# Patient Record
Sex: MALE | Race: WHITE | Employment: FULL TIME | URBAN - METROPOLITAN AREA
[De-identification: names, ages, dates, MRNs, and addresses within clinical notes are randomized per-mention and may not be internally consistent; named-entity substitution may affect disease eponyms.]

---

## 2017-01-31 ENCOUNTER — APPOINTMENT (OUTPATIENT)
Dept: GENERAL RADIOLOGY | Facility: CLINIC | Age: 27
End: 2017-01-31
Attending: EMERGENCY MEDICINE
Payer: COMMERCIAL

## 2017-01-31 ENCOUNTER — HOSPITAL ENCOUNTER (EMERGENCY)
Facility: CLINIC | Age: 27
Discharge: HOME | End: 2017-01-31
Attending: EMERGENCY MEDICINE | Admitting: EMERGENCY MEDICINE
Payer: COMMERCIAL

## 2017-01-31 VITALS
OXYGEN SATURATION: 98 % | RESPIRATION RATE: 16 BRPM | SYSTOLIC BLOOD PRESSURE: 118 MMHG | DIASTOLIC BLOOD PRESSURE: 72 MMHG | TEMPERATURE: 98.3 F

## 2017-01-31 DIAGNOSIS — T78.40XA ALLERGIC REACTION, INITIAL ENCOUNTER: ICD-10-CM

## 2017-01-31 DIAGNOSIS — R22.0 RIGHT FACIAL SWELLING: ICD-10-CM

## 2017-01-31 DIAGNOSIS — R22.0 NASAL SWELLING: ICD-10-CM

## 2017-01-31 PROCEDURE — 99284 EMERGENCY DEPT VISIT MOD MDM: CPT | Performed by: EMERGENCY MEDICINE

## 2017-01-31 PROCEDURE — 96365 THER/PROPH/DIAG IV INF INIT: CPT

## 2017-01-31 PROCEDURE — 94640 AIRWAY INHALATION TREATMENT: CPT

## 2017-01-31 PROCEDURE — 96375 TX/PRO/DX INJ NEW DRUG ADDON: CPT

## 2017-01-31 PROCEDURE — 70220 X-RAY EXAM OF SINUSES: CPT

## 2017-01-31 PROCEDURE — 25000128 H RX IP 250 OP 636: Performed by: EMERGENCY MEDICINE

## 2017-01-31 PROCEDURE — 96361 HYDRATE IV INFUSION ADD-ON: CPT

## 2017-01-31 PROCEDURE — 94640 AIRWAY INHALATION TREATMENT: CPT | Mod: 76

## 2017-01-31 PROCEDURE — 40000275 ZZH STATISTIC RCP TIME EA 10 MIN

## 2017-01-31 PROCEDURE — 99284 EMERGENCY DEPT VISIT MOD MDM: CPT | Mod: 25

## 2017-01-31 PROCEDURE — 25000125 ZZHC RX 250: Performed by: EMERGENCY MEDICINE

## 2017-01-31 RX ORDER — METHYLPREDNISOLONE SODIUM SUCCINATE 125 MG/2ML
125 INJECTION, POWDER, LYOPHILIZED, FOR SOLUTION INTRAMUSCULAR; INTRAVENOUS ONCE
Status: COMPLETED | OUTPATIENT
Start: 2017-01-31 | End: 2017-01-31

## 2017-01-31 RX ORDER — PREDNISONE 10 MG/1
TABLET ORAL
Qty: 32 TABLET | Refills: 0 | Status: SHIPPED | OUTPATIENT
Start: 2017-01-31 | End: 2017-02-10

## 2017-01-31 RX ORDER — EPINEPHRINE 0.3 MG/.3ML
0.3 INJECTION SUBCUTANEOUS
Qty: 0.6 ML | Refills: 0 | Status: SHIPPED | OUTPATIENT
Start: 2017-01-31

## 2017-01-31 RX ORDER — DIPHENHYDRAMINE HYDROCHLORIDE 50 MG/ML
50 INJECTION INTRAMUSCULAR; INTRAVENOUS ONCE
Status: COMPLETED | OUTPATIENT
Start: 2017-01-31 | End: 2017-01-31

## 2017-01-31 RX ORDER — IPRATROPIUM BROMIDE AND ALBUTEROL SULFATE 2.5; .5 MG/3ML; MG/3ML
3 SOLUTION RESPIRATORY (INHALATION) ONCE
Status: COMPLETED | OUTPATIENT
Start: 2017-01-31 | End: 2017-01-31

## 2017-01-31 RX ORDER — ALBUTEROL SULFATE 90 UG/1
2 AEROSOL, METERED RESPIRATORY (INHALATION) EVERY 4 HOURS PRN
Qty: 1 INHALER | Refills: 0 | Status: SHIPPED | OUTPATIENT
Start: 2017-01-31

## 2017-01-31 RX ORDER — SODIUM CHLORIDE 9 MG/ML
1000 INJECTION, SOLUTION INTRAVENOUS CONTINUOUS
Status: DISCONTINUED | OUTPATIENT
Start: 2017-01-31 | End: 2017-01-31 | Stop reason: HOSPADM

## 2017-01-31 RX ADMIN — DIPHENHYDRAMINE HYDROCHLORIDE 50 MG: 50 INJECTION, SOLUTION INTRAMUSCULAR; INTRAVENOUS at 12:58

## 2017-01-31 RX ADMIN — FAMOTIDINE 20 MG: 20 INJECTION, SOLUTION INTRAVENOUS at 13:31

## 2017-01-31 RX ADMIN — IPRATROPIUM BROMIDE AND ALBUTEROL SULFATE 3 ML: .5; 3 SOLUTION RESPIRATORY (INHALATION) at 12:50

## 2017-01-31 RX ADMIN — SODIUM CHLORIDE 1000 ML: 9 INJECTION, SOLUTION INTRAVENOUS at 12:58

## 2017-01-31 RX ADMIN — METHYLPREDNISOLONE SODIUM SUCCINATE 125 MG: 125 INJECTION, POWDER, FOR SOLUTION INTRAMUSCULAR; INTRAVENOUS at 12:59

## 2017-01-31 RX ADMIN — IPRATROPIUM BROMIDE AND ALBUTEROL SULFATE 3 ML: .5; 3 SOLUTION RESPIRATORY (INHALATION) at 13:31

## 2017-01-31 NOTE — ED AVS SNAPSHOT
[facility] Emergency Department    [address] [facility] [address]    Phone:  [phone]    Fax:  [phone]                                       Jose Daniel Olvera   MRN: [id number]    Department:  [facility] Emergency Department   Date of Visit:  1/31/2017           After Visit Summary Signature Page     I have received my discharge instructions, and my questions have been answered. I have discussed any challenges I see with this plan with the nurse or doctor.    ..........................................................................................................................................  Patient/Patient Representative Signature      ..........................................................................................................................................  Patient Representative Print Name and Relationship to Patient    ..................................................               ................................................  Date                                            Time    ..........................................................................................................................................  Reviewed by Signature/Title    ...................................................              ..............................................  Date                                                            Time

## 2017-01-31 NOTE — ED NOTES
Pt started feeling like he was having an allergic reaction last evening.  Took Benadryl before bed.  throat felt tight last night and again today.   Pt has red sore throat.  No Benadryl today.

## 2017-01-31 NOTE — ED PROVIDER NOTES
History     Chief Complaint   Patient presents with     Allergic Reaction     Pt started feeling like he was having an allergic reaction last evening.  Took Benadryl before bed.  throat felt tight last night and again today.      HPI  Jose Daniel Olvera is a 26 year old male who with possible allergic reaction.  Patient states that last evening he noted slight swelling on the right lateral lower eyelid area.  Subsequently this area swelled significantly and he developed tightness in his throat.  He also has had some wheezing but this is not unusual for him.  He did not use his inhaler last evening or today.  He took Benadryl with some improvement but upon awaking this morning his symptoms have recurred.  He denies chest pain or significant shortness of breath currently.  He has not had a cough.  He is able to handle secretions and drink without difficulty.  His voice is not changed.  Other than the swelling on his face he denies other skin rashes.  He does have mild pain behind his right ear.  There's been no drainage from the ear.  He's had previous reactions when he is eating apples but has not had any recently.  No other new contacts or exposures, soaps, lotions, medicines, or recent upper respiratory illness.    I have reviewed the Medications, Allergies, Past Medical and Surgical History, and Social History in the Epic system.    Review of Systems all other systems reviewed and are negative.  No past medical history on file.  There is no problem list on file for this patient.    Current Facility-Administered Medications   Medication     0.9% sodium chloride infusion     famotidine (PEPCID) infusion 20 mg     Current Outpatient Prescriptions   Medication     albuterol (ALBUTEROL) 108 (90 BASE) MCG/ACT Inhaler     predniSONE (DELTASONE) 10 MG tablet     EPINEPHrine 0.3 MG/0.3ML injection     [DISCONTINUED] albuterol (PROAIR HFA, PROVENTIL HFA, VENTOLIN HFA) 108 (90 BASE) MCG/ACT inhaler        Allergies   Allergen  Reactions     Augmentin Hives and Itching     Social History     Social History     Marital Status: Single     Spouse Name: N/A     Number of Children: N/A     Years of Education: N/A     Occupational History     Not on file.     Social History Main Topics     Smoking status: Not on file     Smokeless tobacco: Not on file     Alcohol Use: Not on file     Drug Use: Not on file     Sexual Activity: Not on file     Other Topics Concern     Not on file     Social History Narrative     No narrative on file     No family history on file.    Physical Exam   BP: (!) 143/93 mmHg  Heart Rate: 65  Temp: 98.3  F (36.8  C)  Resp: 16  SpO2: 98 %  Physical Exam Gen. alert cooperative male in mild to moderate distress.  HEENT shows pupils to be equal and react to light and accommodation.  Extraocular muscles are intact.  No scleral injection or mattering.  He has soft tissue swelling under and lateral to the right eye and cheek area.  It is not erythematous or warm to the touch.  His ears are clear bilaterally.  Nasal passages are swollen to near occlusion without drainage currently.  Orally he has mild tonsillar erythema and enlargement but no exudate.  He has mild tenderness of the upper right gum line but there is no erythema, fluctuance, or dental tenderness.  Behind his right ear he has a small lesion consistent with a comedone.  Scalp examination reveals no other lesions that would suggest shingles.  Neck is supple without adenopathy or stridor.  Lungs reveal expiratory wheezes.  Cardiac regular rate without murmur.  Other than the face and ear issues no other skin rashes are noted.    ED Course   Procedures         Results for orders placed or performed during the hospital encounter of 01/31/17   XR Sinus Complete G/E 3 Views    Narrative    XR SINUS COMPLETE G/E 3 VW 1/31/2017 2:05 PM    COMPARISON: None.    HISTORY: Right facial swelling with nasal congestion and drainage.      Impression    IMPRESSION: No air-fluid levels  "are seen in the sinuses.    DAMIÁN GREY            Critical Care time:  none               Labs Ordered and Resulted from Time of ED Arrival Up to the Time of Departure from the ED - No data to display  IV is established patient was given Benadryl, Solu-Medrol, and Pepcid.  At 120 and rechecked the patient still had swelling under the eye.  Did not feel tight in his throat.  Still wheezing.  DuoNeb is ordered.  Sinus water x-rays ordered.  At 245 on recheck patient states he had good response from the DuoNeb.  He does not have an inhaler at home so that will be provided.  He and his girlfriend also requested an EpiPen as he \"travels a lot.\"  He has never had a anaphylactic reaction.  Patient's facial swelling has improved.  Assessments & Plan (with Medical Decision Making)   Gen. this is a 26-year-old male with possible allergic reaction with facial swelling and throat tightness.  He's had similar reactions previously when eating apple products but has not eaten apples recently.  He denies any other new exposures to medicines, clothing, lotions, soaps, or foods.  He does have a history of reactive airways and asthma.  He's never had issues with aspirin or NSAIDs causing hives.  Patient took Benadryl last evening with some improvement.  On exam patient is afebrile and vital signs are stable.  He has swelling on his right cheek extending up under his right eye.  The eye itself is not involved.  He has bilateral nasal swelling.  He does not have tenderness over the frontal or maxillary sinuses.  A sinus x-ray shows no evidence of sinus disease.  He did have a small comedone on the back of his right ear but no other blisters or lesions are noted that would suggest shingles.  They will watch for disorder.  Patient did have wheezing on auscultation which improved with IV meds and nebulization.  Patient denies tobacco use.  Patient will be given a steroid burst and taper.  He understands this can cause GI upset and " insomnia.  Also provided a albuterol inhaler for recurrent wheezing.  He still urged help with his reactive airways also.  He can continue Benadryl.  He is given a prescription for EpiPen that he can fill but he understands if he is that he needs to be seen immediately.  Handout on allergic reaction is provided.  Reasons to return for reassessment were discussed.  I have reviewed the nursing notes.    I have reviewed the findings, diagnosis, plan and need for follow up with the patient.    New Prescriptions    ALBUTEROL (ALBUTEROL) 108 (90 BASE) MCG/ACT INHALER    Inhale 2 puffs into the lungs every 4 hours as needed for shortness of breath / dyspnea    EPINEPHRINE 0.3 MG/0.3ML INJECTION    Inject 0.3 mLs (0.3 mg) into the muscle once as needed    PREDNISONE (DELTASONE) 10 MG TABLET    Take 4 tablets daily for 5 days,  take 2 tablets daily for 3 days, take 1 tablet daily for 3 days, take half a tablet for 3 days.       Final diagnoses:   Right facial swelling   Allergic reaction, initial encounter   Nasal swelling       1/31/2017   [facility] EMERGENCY DEPARTMENT      Ga Macias MD  01/31/17 1006

## 2017-01-31 NOTE — DISCHARGE INSTRUCTIONS
"  Allergic Reaction, Other [Local]  You are having an allergic reaction. This is due to exposure to something you have become sensitive to. This may be a household product, medicine, chemical, soap, cream, cosmetics or jewelry. A sting from an insect (that you were not aware of) can also cause this reaction. Sometimes it is difficult to know exactly what caused this reaction. There may be redness, itching, and swelling. The rash will fade over the next few days.  Home Care:    If itching is a problem, avoid anything that heats up your skin (hot showers/baths, direct sunlight) since heat will make itching worse.    An ice pack (ice cubes in a plastic bag, wrapped in a towel) will reduce local areas of redness and itching. Lanacaine cream or Solarcaine spray (or other product containing \"benzocaine\") will reduce the itching.    Oral Benadryl (diphenhydramine) is an antihistamine available at drug and grocery stores. Unless a prescription antihistamine was given, Benadryl may be used to reduce itching if large areas of the skin are involved. Use lower doses during the daytime and higher doses at bedtime since the drug may make you sleepy. [NOTE: Do not use Benadryl if you have glaucoma or if you are a man with trouble urinating due to an enlarged prostate.] Claritin (loratadine) is an antihistamine that causes less drowsiness and is a good alternative for daytime use.  Follow Up  with your doctor or this facility in the next two days if your symptoms do not continue to improve.  Get Prompt Medical Attention  if any of the following occur:    Spreading areas of itching, redness or swelling    New or worse swelling in the face, eyelids, lips, mouth, throat or tongue    Trouble swallowing or breathing    Dizziness, weakness or fainting    Signs of infection:    Spreading redness    Increased pain or swelling    Fever of 100.4 F (38 C) or higher, or as directed by your healthcare provider    Colored fluid draining from the " wound    5079-9555 [employer] [address] [place] [address], [place] [address]. All rights reserved. This information is not intended as a substitute for professional medical care. Always follow your healthcare professional's instructions.

## 2017-01-31 NOTE — ED AVS SNAPSHOT
" [facility] Emergency Department    [address] [facility] [address]    Phone:  [phone]    Fax:  [phone]                                       Jose Daniel Olvera   MRN: [id number]    Department:  [facility] Emergency Department   Date of Visit:  1/31/2017           Patient Information     Date Of Birth          1990        Your diagnoses for this visit were:     Right facial swelling     Allergic reaction, initial encounter     Nasal swelling        You were seen by Ga Macias MD.      Follow-up Information     Follow up with Primary Deloris Laura MD.    Why:  As needed        Discharge Instructions         Allergic Reaction, Other [Local]  You are having an allergic reaction. This is due to exposure to something you have become sensitive to. This may be a household product, medicine, chemical, soap, cream, cosmetics or jewelry. A sting from an insect (that you were not aware of) can also cause this reaction. Sometimes it is difficult to know exactly what caused this reaction. There may be redness, itching, and swelling. The rash will fade over the next few days.  Home Care:    If itching is a problem, avoid anything that heats up your skin (hot showers/baths, direct sunlight) since heat will make itching worse.    An ice pack (ice cubes in a plastic bag, wrapped in a towel) will reduce local areas of redness and itching. Lanacaine cream or Solarcaine spray (or other product containing \"benzocaine\") will reduce the itching.    Oral Benadryl (diphenhydramine) is an antihistamine available at drug and grocery stores. Unless a prescription antihistamine was given, Benadryl may be used to reduce itching if large areas of the skin are involved. Use lower doses during the daytime and higher doses at bedtime since the drug may make you sleepy. [NOTE: Do not use Benadryl if you have glaucoma or if you are a man with trouble urinating due to an enlarged prostate.] Claritin (loratadine) is an " antihistamine that causes less drowsiness and is a good alternative for daytime use.  Follow Up  with your doctor or this facility in the next two days if your symptoms do not continue to improve.  Get Prompt Medical Attention  if any of the following occur:    Spreading areas of itching, redness or swelling    New or worse swelling in the face, eyelids, lips, mouth, throat or tongue    Trouble swallowing or breathing    Dizziness, weakness or fainting    Signs of infection:    Spreading redness    Increased pain or swelling    Fever of 100.4 F (38 C) or higher, or as directed by your healthcare provider    Colored fluid draining from the wound    4295-2460 [employer]. [address] [place] [address], [place] [address]. All rights reserved. This information is not intended as a substitute for professional medical care. Always follow your healthcare professional's instructions.          24 Hour Appointment Hotline       To make an appointment at any [facility], call [phone] ([phone]). If you don't have a family doctor or clinic, we will help you find one. [place] clinics are conveniently located to serve the needs of you and your family.             Review of your medicines      START taking        Dose / Directions Last dose taken    EPINEPHrine 0.3 MG/0.3ML injection   Dose:  0.3 mg   Quantity:  0.6 mL        Inject 0.3 mLs (0.3 mg) into the muscle once as needed   Refills:  0        predniSONE 10 MG tablet   Commonly known as:  DELTASONE   Quantity:  32 tablet        Take 4 tablets daily for 5 days,  take 2 tablets daily for 3 days, take 1 tablet daily for 3 days, take half a tablet for 3 days.   Refills:  0          CONTINUE these medicines which may have CHANGED, or have new prescriptions. If we are uncertain of the size of tablets/capsules you have at home, strength may be listed as something that might have changed.        Dose / Directions Last dose taken    albuterol 108 (90 BASE)  "MCG/ACT Inhaler   Commonly known as:  albuterol   Dose:  2 puff   What changed:    - when to take this  - reasons to take this   Quantity:  1 Inhaler        Inhale 2 puffs into the lungs every 4 hours as needed for shortness of breath / dyspnea   Refills:  0                Prescriptions were sent or printed at these locations (3 Prescriptions)                   Other Prescriptions                Printed at Department/Unit printer (3 of 3)         albuterol (ALBUTEROL) 108 (90 BASE) MCG/ACT Inhaler               predniSONE (DELTASONE) 10 MG tablet               EPINEPHrine 0.3 MG/0.3ML injection                Procedures and tests performed during your visit     XR Sinus Complete G/E 3 Views      Orders Needing Specimen Collection     None      Pending Results     No orders found from 1/30/2017 to 2/1/2017.            Pending Culture Results     No orders found from 1/30/2017 to 2/1/2017.       Test Results from your hospital stay           1/31/2017  2:13 PM - Interface, Radiant Ib      Narrative     XR SINUS COMPLETE G/E 3 VW 1/31/2017 2:05 PM    COMPARISON: None.    HISTORY: Right facial swelling with nasal congestion and drainage.        Impression     IMPRESSION: No air-fluid levels are seen in the sinuses.    DAMIÁN GREY                Thank you for choosing [facility]       Thank you for choosing [facility] for your care. Our goal is always to provide you with excellent care. Hearing back from our patients is one way we can continue to improve our services. Please take a few minutes to complete the written survey that you may receive in the mail after you visit with us. Thank you!        [email or web address] Information     [employer] lets you send messages to your doctor, view your test results, renew your prescriptions, schedule appointments and more. To sign up, go to [email or web address] . Click on \"Log in\" on the left side of the screen, which will take you to the Welcome page. Then click on \"Sign up Now\" on the right " side of the page.     You will be asked to enter the access code listed below, as well as some personal information. Please follow the directions to create your username and password.     Your access code is: [email or web address]  Expires: 2017  2:53 PM     Your access code will  in 90 days. If you need help or a new code, please call your [facility] clinic or [phone].        Care EveryWhere ID     This is your Care EveryWhere ID. This could be used by other organizations to access your [facility] medical records  [id number]        After Visit Summary       This is your record. Keep this with you and show to your community pharmacist(s) and doctor(s) at your next visit.

## 2017-01-31 NOTE — ED NOTES
"Pt reports right eye \" a little puffy \" last night at 2130 and felt a little tightness in throat but no breathing.   Pt reports nasal congestion and \"felt nose was completely blocked last night\"    Pt took 3 benadryl and went to bed.   Pt woke up this am with increased puffiness around right eye.  A little bit of tightness in throat, swallowing better than last night, denies difficulty breathing.   No edema around throat noted.    Girlfriend at bedside.    "

## 2018-06-21 ENCOUNTER — HOSPITAL ENCOUNTER (EMERGENCY)
Facility: CLINIC | Age: 28
Discharge: HOME | End: 2018-06-21
Attending: STUDENT IN AN ORGANIZED HEALTH CARE EDUCATION/TRAINING PROGRAM | Admitting: STUDENT IN AN ORGANIZED HEALTH CARE EDUCATION/TRAINING PROGRAM
Payer: COMMERCIAL

## 2018-06-21 VITALS
RESPIRATION RATE: 20 BRPM | DIASTOLIC BLOOD PRESSURE: 84 MMHG | SYSTOLIC BLOOD PRESSURE: 146 MMHG | HEART RATE: 77 BPM | TEMPERATURE: 98.6 F | OXYGEN SATURATION: 97 %

## 2018-06-21 DIAGNOSIS — T78.40XA ALLERGIC REACTION, INITIAL ENCOUNTER: ICD-10-CM

## 2018-06-21 DIAGNOSIS — L50.9 URTICARIA: ICD-10-CM

## 2018-06-21 PROCEDURE — 96375 TX/PRO/DX INJ NEW DRUG ADDON: CPT | Performed by: STUDENT IN AN ORGANIZED HEALTH CARE EDUCATION/TRAINING PROGRAM

## 2018-06-21 PROCEDURE — 96366 THER/PROPH/DIAG IV INF ADDON: CPT | Performed by: STUDENT IN AN ORGANIZED HEALTH CARE EDUCATION/TRAINING PROGRAM

## 2018-06-21 PROCEDURE — 96374 THER/PROPH/DIAG INJ IV PUSH: CPT | Performed by: STUDENT IN AN ORGANIZED HEALTH CARE EDUCATION/TRAINING PROGRAM

## 2018-06-21 PROCEDURE — 96372 THER/PROPH/DIAG INJ SC/IM: CPT | Performed by: STUDENT IN AN ORGANIZED HEALTH CARE EDUCATION/TRAINING PROGRAM

## 2018-06-21 PROCEDURE — 25000128 H RX IP 250 OP 636: Performed by: STUDENT IN AN ORGANIZED HEALTH CARE EDUCATION/TRAINING PROGRAM

## 2018-06-21 PROCEDURE — 96365 THER/PROPH/DIAG IV INF INIT: CPT | Performed by: STUDENT IN AN ORGANIZED HEALTH CARE EDUCATION/TRAINING PROGRAM

## 2018-06-21 PROCEDURE — 99284 EMERGENCY DEPT VISIT MOD MDM: CPT | Mod: 25 | Performed by: STUDENT IN AN ORGANIZED HEALTH CARE EDUCATION/TRAINING PROGRAM

## 2018-06-21 PROCEDURE — 99284 EMERGENCY DEPT VISIT MOD MDM: CPT | Mod: Z6 | Performed by: STUDENT IN AN ORGANIZED HEALTH CARE EDUCATION/TRAINING PROGRAM

## 2018-06-21 PROCEDURE — 25000125 ZZHC RX 250: Performed by: STUDENT IN AN ORGANIZED HEALTH CARE EDUCATION/TRAINING PROGRAM

## 2018-06-21 RX ORDER — LIDOCAINE 40 MG/G
CREAM TOPICAL
Status: DISCONTINUED | OUTPATIENT
Start: 2018-06-21 | End: 2018-06-22 | Stop reason: HOSPADM

## 2018-06-21 RX ORDER — METHYLPREDNISOLONE SODIUM SUCCINATE 125 MG/2ML
125 INJECTION, POWDER, LYOPHILIZED, FOR SOLUTION INTRAMUSCULAR; INTRAVENOUS ONCE
Status: COMPLETED | OUTPATIENT
Start: 2018-06-21 | End: 2018-06-21

## 2018-06-21 RX ADMIN — EPINEPHRINE 0.3 MG: 1 INJECTION, SOLUTION, CONCENTRATE INTRAVENOUS at 22:50

## 2018-06-21 RX ADMIN — FAMOTIDINE 20 MG: 20 INJECTION, SOLUTION INTRAVENOUS at 21:28

## 2018-06-21 RX ADMIN — SODIUM CHLORIDE 1000 ML: 9 INJECTION, SOLUTION INTRAVENOUS at 21:22

## 2018-06-21 RX ADMIN — METHYLPREDNISOLONE SODIUM SUCCINATE 125 MG: 125 INJECTION, POWDER, FOR SOLUTION INTRAMUSCULAR; INTRAVENOUS at 21:22

## 2018-06-21 NOTE — ED AVS SNAPSHOT
[facility] Emergency Department    [address] [facility] [address]    Phone:  [phone]    Fax:  [phone]                                       Jose Daniel Olvera   MRN: [id number]    Department:  [facility] Emergency Department   Date of Visit:  6/21/2018           After Visit Summary Signature Page     I have received my discharge instructions, and my questions have been answered. I have discussed any challenges I see with this plan with the nurse or doctor.    ..........................................................................................................................................  Patient/Patient Representative Signature      ..........................................................................................................................................  Patient Representative Print Name and Relationship to Patient    ..................................................               ................................................  Date                                            Time    ..........................................................................................................................................  Reviewed by Signature/Title    ...................................................              ..............................................  Date                                                            Time

## 2018-06-21 NOTE — ED AVS SNAPSHOT
[facility] Emergency Department    [address] [facility] [address]    Phone:  [phone]    Fax:  [phone]                                       Jose Daniel Olvera   MRN: [id number]    Department:  [facility] Emergency Department   Date of Visit:  6/21/2018           Patient Information     Date Of Birth          1990        Your diagnoses for this visit were:     Allergic reaction, initial encounter     Urticaria        You were seen by Mason Gleason DO.      Follow-up Information     Follow up with [facility]. Schedule an appointment as soon as possible for a visit in 1 week.    Specialty:  Allergy    Why:  Followup for reevaluation and managment plan.    Contact information:    [address] [place] [address] [phone]    Additional information:    The medical center is located at   [address] [place] [address] (between [address]   [address] [facility], four miles north   of [place]).        Go to [facility] Emergency Department.    Specialty:  EMERGENCY MEDICINE    Why:  Return if symptoms change/progress.    Contact information:    [facility] [address] [phone]    Additional information:    The medical center is located at   [address] [place] [address] (between [facility] and   [address] in [place], four miles north   of [place]).      Discharge References/Attachments     REACTION, MEDICINE: ALLERGIC (ENGLISH)    ANAPHYLAXIS, GENERAL (ENGLISH)      24 Hour Appointment Hotline       To make an appointment at any [facility], call [phone] ([phone]). If you don't have a family doctor or clinic, we will help you find one. [facility] are conveniently located to serve the needs of you and your family.          ED Discharge Orders     ALLERGY/ASTHMA ADULT REFERRAL       Your provider has referred you to: [facility]: [facility] [phone]  [email or web address]    Please be aware that coverage of these services is subject to the terms and limitations of your health insurance plan.  Call member services at your health plan with any benefit or coverage questions.      Please bring the following with you to your appointment:    (1) Any X-Rays, CTs or MRIs which have been performed.  Contact the facility where they were done to arrange for  prior to your scheduled appointment.    (2) List of current medications  (3) This referral request   (4) Any documents/labs given to you for this referral                     Review of your medicines      Our records show that you are taking the medicines listed below. If these are incorrect, please call your family doctor or clinic.        Dose / Directions Last dose taken    albuterol 108 (90 Base) MCG/ACT Inhaler   Commonly known as:  PROAIR HFA   Dose:  2 puff   Quantity:  1 Inhaler        Inhale 2 puffs into the lungs every 4 hours as needed for shortness of breath / dyspnea   Refills:  0        EPINEPHrine 0.3 MG/0.3ML injection 2-pack   Commonly known as:  EPIPEN/ADRENACLICK/or ANY BX GENERIC EQUIV   Dose:  0.3 mg   Quantity:  0.6 mL        Inject 0.3 mLs (0.3 mg) into the muscle once as needed   Refills:  0                Procedures and tests performed during your visit     Cardiac Continuous Monitoring    Peripheral IV: Standard    Pulse oximetry nursing    Vital signs      Orders Needing Specimen Collection     None      Pending Results     No orders found from 6/19/2018 to 6/22/2018.            Pending Culture Results     No orders found from 6/19/2018 to 6/22/2018.            Pending Results Instructions     If you had any lab results that were not finalized at the time of your Discharge, you can call the ED Lab Result RN at [phone]. You will be contacted by this team for any positive Lab results or changes in treatment. The nurses are available 7 days a week from 10A to  "6:30P.  You can leave a message 24 hours per day and they will return your call.        Test Results From Your Hospital Stay               Thank you for choosing [facility]       Thank you for choosing [facility] for your care. Our goal is always to provide you with excellent care. Hearing back from our patients is one way we can continue to improve our services. Please take a few minutes to complete the written survey that you may receive in the mail after you visit with us. Thank you!        [facility] Information     [facility] lets you send messages to your doctor, view your test results, renew your prescriptions, schedule appointments and more. To sign up, go to [email or web address] . Click on \"Log in\" on the left side of the screen, which will take you to the Welcome page. Then click on \"Sign up Now\" on the right side of the page.     You will be asked to enter the access code listed below, as well as some personal information. Please follow the directions to create your username and password.     Your access code is: [email or web address]  Expires: 2018 11:42 PM     Your access code will  in 90 days. If you need help or a new code, please call your [facility] clinic or [phone].        Care EveryWhere ID     This is your Care EveryWhere ID. This could be used by other organizations to access your [facility] medical records  [id number]        Equal Access to Services     REAL LOZA AH: Hadii kacie Higgins, waaxda luqadaha, qaybta kaalmada jenae, misael taylor. So [facility] [phone].    ATENCIÓN: Si habla español, tiene a kitchen disposición servicios gratuitos de asistencia lingüística. Alicia al [phone].    We comply with applicable federal civil rights laws and [place] laws. We do not discriminate on the basis of race, color, national origin, age, disability, sex, sexual orientation, or gender identity.            After Visit Summary       This is your record. Keep this " with you and show to your community pharmacist(s) and doctor(s) at your next visit.

## 2018-06-22 NOTE — ED PROVIDER NOTES
History     Chief Complaint   Patient presents with     Facial Swelling     right eye, allergic reaction     HPI  Jose Daniel Olvera is a 28 year old male who presents for evaluation of facial and lip swelling as well as skin rash which developed around 3 hours prior to arrival.  Patient explains that he had no history of seasonal or environmental allergies growing up but over the past couple of years has had episodes of facial swelling and skin rashes abruptly developed and he attributes to certain fruits and vegetables.  He states that [place] when he went outside and felt an itch along the outside of his right eye, after scratching the itchy developed right-sided periorbital swelling.  Shortly afterwards he developed upper lip angioedema and skin rash of torso and upper extremities.  The symptoms are similar to what he has experienced in the past, he did not have an epinephrine pen with him but did take 5 tablets of 25 mg Benadryl orally before coming to the department.  He feels as though the symptoms may be improving a bit but still has upper lip edema.  Patient denies headache, sore throat, chest pain, shortness of breath, difficulty swallowing, abdominal pain, nausea/vomiting, diarrhea, or other associated symptoms.    Problem List:    There are no active problems to display for this patient.       Past Medical History:    No past medical history on file.    Past Surgical History:    No past surgical history on file.    Family History:    No family history on file.    Social History:  Marital Status:  Single [1]  Social History   Substance Use Topics     Smoking status: Not on file     Smokeless tobacco: Not on file     Alcohol use Not on file        Medications:      albuterol (ALBUTEROL) 108 (90 BASE) MCG/ACT Inhaler   EPINEPHrine 0.3 MG/0.3ML injection         Review of Systems  Constitutional:  Negative for fever or recent illness.  HENT: Positive for upper lip angioedema.   Negative for tongue swelling or throat swelling sensation.  Eye: Negative for diplopia or diminished vision from baseline.  Cardiovascular:  Negative for chest pain.  Respiratory:  Negative for cough or shortness of breath.  Gastrointestinal:  Negative for abdominal pain, nausea, vomiting, or diarrhea.   Musculoskeletal:  Negative for neck stiffness or joint pain.  Neurological:  Negative for headache.  Skin: Positive for pruritic rash of torso.    All others reviewed and are negative.      Physical Exam   BP: (!) 146/97  Pulse: 77  Temp: 98.6  F (37  C)  Resp: 20  SpO2: 97 %      Physical Exam  Constitutional:  Well developed, well nourished.  Appears nontoxic and in no acute distress.   HENT: Subtle angioedema of upper lip bilaterally.  Pronounced right sided periorbital edema.  No sign of injury or trauma.  Eyes:  Conjunctivae are normal.  EOMI.  PERRLA.  Oral:  Patient is without trismus.  Moist oral mucosa.  Normal dentition for age.  Negative pharyngeal erythema.  No exudate or soft palate petechiae.  No uvular asymmetry.  Without sublingual or submental swelling.  Voice is not muffled.  Tolerates secretions.  Neck:  Neck supple.  Cardiovascular:  No cyanosis.  RRR.  No audible murmurs noted.   Respiratory:  Effort normal, no respiratory distress.  CTAB without diminished regions.  No wheezing, rhonchi, or crackles.  Gastrointestinal:  Soft, nondistended abdomen.  Nontender and without guarding.  No rigidity or rebound tenderness.  Negative Jane's sign.    Genitourinary:  Noncontributory.  Musculoskeletal:  Moves extremities spontaneously.  Neurological:  Patient is alert.  Skin:  Skin is warm and dry.  Urticarial rash of volar aspect of upper extremities and anterior torso.  Psychiatric:  Normal mood and affect.      ED Course     ED Course     Procedures              Critical Care time:  none               No results found for this or any previous visit (from the past 24 hour(s)).    Medications    lidocaine 1 % 1 mL (not administered)   lidocaine (LMX4) kit (not administered)   sodium chloride (PF) 0.9% PF flush 3 mL (not administered)   sodium chloride (PF) 0.9% PF flush 3 mL (not administered)   0.9% sodium chloride BOLUS (0 mLs Intravenous Stopped 6/21/18 2338)   EPINEPHrine (ADRENALIN) kit 0.3 mg (0.3 mg Intramuscular Given 6/21/18 2250)   methylPREDNISolone sodium succinate (solu-MEDROL) injection 125 mg (125 mg Intravenous Given 6/21/18 2122)   famotidine (PEPCID) infusion 20 mg (0 mg Intravenous Stopped 6/21/18 2338)       Assessments & Plan (with Medical Decision Making)   Jose Daniel Olvera is a 28 year old male who presents to the department for evaluation of right-sided periorbital swelling, torso urticaria, and upper lip angioedema which developed around 3 hours prior to arrival.  Patient explains that he has had similar symptoms a few times over the past years which he attributes to certain fruits and vegetables but tonight was not exposed to any unusual agents that he is aware of.  He does have an epinephrine pen but was not with him at time of symptom onset.  He took 5 tablets of 25 mg Benadryl and believes his symptoms may have mildly improved prior to arrival to the department.  He is hemodynamically stable, without respiratory or gastrointestinal symptoms.  He was given IV antihistamine agent as well as Solu-Medrol and single IM dose of epinephrine.  Symptoms gradually improved during stay in the department.  Patient seems comfortable with discharge plan discussed including strict return instructions for possibility of rebound effect.  Allergy referral order placed upon discharge.      Disclaimer:  This note consists of symbols derived from keyboarding, dictation, and/or voice recognition software.  As a result, there may be errors in the script that have gone undetected.  Please consider this when interpreting information found in the chart.        I have reviewed the nursing notes.    I  have reviewed the findings, diagnosis, plan and need for follow up with the patient.       New Prescriptions    No medications on file       Final diagnoses:   Allergic reaction, initial encounter   Urticaria       6/21/2018   [facility] EMERGENCY DEPARTMENT     Mason Gleason DO  06/21/18 [id number]

## 2018-06-22 NOTE — ED TRIAGE NOTES
Unsure of allergen.  Pt here with nasal congestion, facial swelling and hives. Upper lip swelling and right eye.

## 2018-06-22 NOTE — ED NOTES
Patient presents with facial swelling, states this has happened before. His right eye and face is more edematous than his left side. He stated his eye started out as an irritation that continually got worse. He states he normally has an epi pen but it was unavailable to him so he stated he took 250 mg Benadryl. MD notified. Aicha SETHI on the phone currently with Poison control. MD in room with patient.

## 2018-08-10 ENCOUNTER — OFFICE VISIT (OUTPATIENT)
Dept: ALLERGY | Facility: CLINIC | Age: 28
End: 2018-08-10
Payer: COMMERCIAL

## 2018-08-10 VITALS
BODY MASS INDEX: 24.33 KG/M2 | WEIGHT: 169.97 LBS | HEIGHT: 70 IN | HEART RATE: 65 BPM | RESPIRATION RATE: 16 BRPM | TEMPERATURE: 97.4 F | DIASTOLIC BLOOD PRESSURE: 77 MMHG | SYSTOLIC BLOOD PRESSURE: 133 MMHG | OXYGEN SATURATION: 100 %

## 2018-08-10 DIAGNOSIS — J30.81 CHRONIC ALLERGIC RHINITIS DUE TO ANIMAL HAIR AND DANDER: ICD-10-CM

## 2018-08-10 DIAGNOSIS — J45.30 MILD PERSISTENT ASTHMA WITHOUT COMPLICATION: ICD-10-CM

## 2018-08-10 DIAGNOSIS — J30.1 CHRONIC SEASONAL ALLERGIC RHINITIS DUE TO POLLEN: ICD-10-CM

## 2018-08-10 DIAGNOSIS — Z72.0 TOBACCO ABUSE: ICD-10-CM

## 2018-08-10 DIAGNOSIS — T78.1XXA REACTION TO FOOD, INITIAL ENCOUNTER: Primary | ICD-10-CM

## 2018-08-10 DIAGNOSIS — T78.1XXA POLLEN-FOOD ALLERGY, INITIAL ENCOUNTER: ICD-10-CM

## 2018-08-10 DIAGNOSIS — J30.89 OTHER ALLERGIC RHINITIS: ICD-10-CM

## 2018-08-10 LAB
FEF 25/75: NORMAL
FEV-1: NORMAL
FEV1/FVC: NORMAL
FVC: NORMAL

## 2018-08-10 PROCEDURE — 94060 EVALUATION OF WHEEZING: CPT | Performed by: ALLERGY & IMMUNOLOGY

## 2018-08-10 PROCEDURE — 95004 PERQ TESTS W/ALRGNC XTRCS: CPT | Performed by: ALLERGY & IMMUNOLOGY

## 2018-08-10 PROCEDURE — 99204 OFFICE O/P NEW MOD 45 MIN: CPT | Mod: 25 | Performed by: ALLERGY & IMMUNOLOGY

## 2018-08-10 RX ORDER — EPINEPHRINE 0.3 MG/.3ML
0.3 INJECTION SUBCUTANEOUS PRN
Qty: 0.6 ML | Refills: 3 | Status: SHIPPED | OUTPATIENT
Start: 2018-08-10

## 2018-08-10 ASSESSMENT — ENCOUNTER SYMPTOMS
EYE DISCHARGE: 0
ADENOPATHY: 1
WHEEZING: 1
SHORTNESS OF BREATH: 0
EYE REDNESS: 0
EYE ITCHING: 0
FATIGUE: 0
COUGH: 0
ARTHRALGIAS: 0
CHEST TIGHTNESS: 0
JOINT SWELLING: 0
NAUSEA: 0
FEVER: 0
FACIAL SWELLING: 0
MYALGIAS: 0
HEADACHES: 0
DIARRHEA: 0
ACTIVITY CHANGE: 0
VOMITING: 0
RHINORRHEA: 0
SINUS PRESSURE: 0

## 2018-08-10 NOTE — NURSING NOTE
The following nebulizer treatment was given:     MEDICATION: Albuterol Sulfate 2.5 mg  : [employer]  LOT #: [id number]  EXPIRATION DATE:  Dec 2019  NDC # 4165-6798-53     Nebulizer Start Time:  8:40am  Nebulizer Stop Time:  8:47am  See Vital Signs Flowsheet

## 2018-08-10 NOTE — LETTER
[facility]                   FOOD ALLERGY & ANAPHYLAXIS EMERGENCY CARE PLAN  [employer] Education         Name: Jose Daniel PARKER:  [id number]    Allergy to: being worked up  Weight: 169 lbs 15.59 oz lbs.  Asthma:  Yes  (higher risk for a severe reaction)    -NOTE: Do not depend on antihistamines or inhalers (bronchodilators) to treat a severe reaction. USE EPINEPHRINE.     MEDICATIONS/DOSES  Epinephrine Brand: EpiPen  Epinephrine Dose: 0.3 mg IM  Antihistamine Brand or Generic: Zyrtec (Cetirizine)  Antihistamine Dose: 20 mg  Other (e.g., inhaler-bronchodilator if wheezing): albuterol 4 puffs       FARE                   FOOD ALLERGY & ANAPHYLAXIS EMERGENCY CARE PLAN   [employer] Research [employer] Education         OTHER DIRECTIONS/INFORMATION (may self-carry epinephrine,may self-administer epinephrine, etc.):         EMERGENCY CONTACTS - CALL 911  DOCTOR:  Roman Cardoza MD   PHONE: [phone]  PARENT/GUARDIAN:              PHONE:  OTHER EMERGENCY CONTACTS  NAME/RELATIONSHIP:   PHONE:   NAME/RELATIONSHIP:    PHONE:           PARENT/GUARDIAN AUTHORIZATION SIGNATURE     DATE              PHYSICIAN/H CP AUTHORIZATION SIGNATURE         DATE  FORM PROVIDED [employer]ER[employer] EDUC[employer] (FARE) ([email or web address]) 2014

## 2018-08-10 NOTE — LETTER
"    8/10/2018         RE: Jose Daniel Monroy  [facility] [id number]        Dear Colleague,    Thank you for referring your patient, Jose Daniel Olvera, to the [facility]. Please see a copy of my visit note below.    SUBJECTIVE:                                                                   Jose Daniel Olvera presents today to our Allergy Clinic at [facility] for a new patient visit.   He is a 28-year-old male with several allergic reactions and ED visits for that reason.  He started having these episodes several years ago.  He had 2 ED visits for this reason, in January 2017 and June 2018.    Before his first episode to the ED, the patient had an episode of throat tightness and wheezing after eating and a fresh apple.  He had several reproducible episodes with similar symptoms.    In January 2017, the patient noticed that he had some slight swelling of the right lateral lower eyelid being at home.  He does not think that he ate anything unusual.  In any case, she believes that all the foods that he ate on that day he was able to reintroduce in his diet without any problems.  Subsequently, that slightly swollen area progressed.  He also developed tightness in his throat and wheezing.  He reports having wheezing on other occasions as well, not necessarily related to allergic reactions.  In the ED, he was hypertensive, in mild to moderate distress.  It was noted that he had soft tissue swelling under and lateral to the right eye and cheek area.  He was also wheezing on the exam.  He was treated with DuoNeb, famotidine, diphenhydramine, normal saline, and methylprednisolone. He doesn't think he had an apple on that day.     Between 2017 and 2018 episodes, he had episodes of urticaria, lip swelling, eyelid swelling, wheezing, dermatographism, rhinorrhea, triggered by ingestion of cabbage, celery, apples, watermelon, cantaloupe, and lettuce.  The symptoms were not as \"violent,\" " so he did not present to the ED.  He was treating himself with diphenhydramine successfully.    The last episode that required ED visit happened in June 2018.  [facility] and he had a beer that he did not have before.  Possibly, it had honey in it.  He went outside shortly after drinking it and felt slight pruritus outside of his right eye.  After scratching, he developed right-sided periorbital swelling.  Shortly after, he developed upper lip angioedema and skin rash of the torso and upper extremities.  In the ED, he was hypertensive again, 146/97.  Urticarial rash was noted on upper extremities and torso.  He also had mild angioedema of the lips and right-sided periorbital edema.  The patient was treated with epinephrine, Solu-Medrol, and Pepcid.  Jose Daniel is worried about a new reaction and asks for epinephrine prescription.    About 5 years ago, after having a viral infection, the patient began to have acute chest symptoms manifested by wheezing, dyspnea and chest tightness, triggered by viral respiratory infections, humidity, exertion and pollen in Spring.  He denies having any issues with pets exposure.  Albuterol use acutely improves chest symptoms.  He is using albuterol as needed.  He also uses albuterol pre-exertionally and is working fine with him.    Jose Daniel is using albuterol once a month for rescue from chest symptoms.  He is waking up less than twice per month due to chest symptoms.  There has been no use of oral steroids for the last year.  No ED/PCP/urgent care/other specialist visits for asthma flare for the last year.  The patient denies current chest tightness, cough, wheezing or shortness of breath.  He denies the history of hospitalizations for asthma in the past.  He has never been to the ICU for chest symptoms, and he has never been intubated.  He had a walking pneumonia in the past.  He does not use a spacer/chamber device with the albuterol inhaler.  He smokes cigars on occasions.   He smoked one several days ago.    He has rhinorrhea mainly in Spring. He thinks it's a mild one. Loratadine seems to be helpful.  No history of ear tubes, sinus surgeries, or tonsillectomy/adenoidectomy.    Patient Active Problem List   Diagnosis     Chronic seasonal allergic rhinitis due to pollen     Other allergic rhinitis     Chronic allergic rhinitis due to animal hair and dander     Mild persistent asthma without complication     Pollen-food allergy       History reviewed. No pertinent past medical history.   Problem (# of Occurrences) Relation (Name,Age of Onset)    Allergy (Severe) (2) Father: Food allergies, Sister: Food allergies    Pancreatic Cancer (1) Maternal Grandfather        Past Surgical History:   Procedure Laterality Date     HC TOOTH EXTRACTION W/FORCEP  2010     Social History     Social History     Marital status: Single     Spouse name: N/A     Number of children: N/A     Years of education: N/A     Occupational History           Social History Main Topics     Smoking status: Light Tobacco Smoker     Types: Cigars, Dip, chew, snus or snuff     Smokeless tobacco: Never Used     Alcohol use Yes      Comment: occ.      Drug use: No     Sexual activity: Not Asked     Other Topics Concern     None     Social History Narrative    August 10, 2018    ENVIRONMENTAL HISTORY: The [place] year old [place]. The home is heated with a forced air. They do have central air conditioning. The patient's bedroom is furnished with carpeting in bedroom and fabric window coverings.  Pets inside the house include 1 dog. There is no history of cockroach or mice infestation. There is 1 smoker in the house.  The house does not have a damp basement.                Review of Systems   Constitutional: Negative for activity change, fatigue and fever.   HENT: Negative for congestion, dental problem, ear pain, facial swelling, nosebleeds, postnasal drip, rhinorrhea, sinus pressure and  "sneezing.    Eyes: Negative for discharge, redness and itching.   Respiratory: Positive for wheezing. Negative for cough, chest tightness and shortness of breath.    Cardiovascular: Negative for chest pain.   Gastrointestinal: Negative for diarrhea, nausea and vomiting.   Musculoskeletal: Negative for arthralgias, joint swelling and myalgias.   Skin: Negative for rash.   Neurological: Negative for headaches.   Hematological: Positive for adenopathy.   Psychiatric/Behavioral: Negative for behavioral problems and self-injury.           Current Outpatient Prescriptions:      EPINEPHrine (EPIPEN/ADRENACLICK/OR ANY BX GENERIC EQUIV) 0.3 MG/0.3ML injection 2-pack, Inject 0.3 mLs (0.3 mg) into the muscle as needed for anaphylaxis, Disp: 0.6 mL, Rfl: 3     fluticasone furoate (ARNUITY ELLIPTA) 100 MCG/ACT AEPB inhalation powder, Inhale 1 puff into the lungs daily, Disp: 1 each, Rfl: 3     albuterol (ALBUTEROL) 108 (90 BASE) MCG/ACT Inhaler, Inhale 2 puffs into the lungs every 4 hours as needed for shortness of breath / dyspnea (Patient not taking: Reported on 8/10/2018), Disp: 1 Inhaler, Rfl: 0     DiphenhydrAMINE HCl (BENADRYL PO), , Disp: , Rfl:      EPINEPHrine 0.3 MG/0.3ML injection, Inject 0.3 mLs (0.3 mg) into the muscle once as needed (Patient not taking: Reported on 8/10/2018), Disp: 0.6 mL, Rfl: 0     Loratadine (CLARITIN PO), , Disp: , Rfl:     There is no immunization history on file for this patient.  Allergies   Allergen Reactions     Augmentin Hives and Itching     OBJECTIVE:                                                                 /77 (BP Location: Left arm, Patient Position: Sitting, Cuff Size: Adult Regular)  Pulse 65  Temp 97.4  F (36.3  C) (Oral)  Resp 16  Ht 1.77 m (5' 9.69\")  Wt 77.1 kg (169 lb 15.6 oz)  SpO2 100%  BMI 24.61 kg/m2        Physical Exam   Constitutional: He is oriented to person, place, and time. No distress.   HENT:   Head: Normocephalic and atraumatic.   Right Ear: " Tympanic membrane, external ear and ear canal normal.   Left Ear: Tympanic membrane, external ear and ear canal normal.   Nose: No mucosal edema or rhinorrhea.   Mouth/Throat: Mucous membranes are normal. No oropharyngeal exudate, posterior oropharyngeal edema or posterior oropharyngeal erythema.   Cobblestoning of posterior oropharynx noted   Eyes: Conjunctivae are normal. Right eye exhibits no discharge. Left eye exhibits no discharge.   Neck: Normal range of motion.   Cardiovascular: Normal rate, regular rhythm and normal heart sounds.    No murmur heard.  Pulmonary/Chest: Effort normal. No respiratory distress. He has no decreased breath sounds. He has wheezes (occasional, end expiratory, R>L). He has no rhonchi. He has no rales.   Musculoskeletal: Normal range of motion.   Lymphadenopathy:     He has no cervical adenopathy.   Neurological: He is alert and oriented to person, place, and time.   Skin: Skin is warm. He is not diaphoretic.   Psychiatric: Affect normal.   Nursing note and vitals reviewed.      WORKUP:     SPIROMETRY  initial FVC 5.33L (97% of predicted); after bronchodilator 5.32L (+0% change)   initial FEV1 4.02L (88% of predicted); after bronchodilator 4.37L (+8% change)  initial FEV1/FVC 75%; after bronchodilator 82%  initial FEF 25%-75% 3.43L/s (72% of predicted); after bronchodilator 4.21L/s (+22% change)    Office spirometry performed today suggests borderline small airway limitation. No reversibility after nebulized albuterol noted.  However, wheezing resolved after the nebulized treatment.    Asthma Control Test (ACT) total score: 22      Percutaneous skin puncture testing for aeroallergens performed today (August 10, 2018) showed sensitivity to dog, grass pollen (Tomi grass and grass mix #7), tree pollen (red Cedar, Maple/Buckingham, American Elm, Birch mix, [place], oak, cocklebur, and [place]), and weed pollen (Kochia, ragweed, Sagebrush/mugwort, and Sorrel) and feather mix.  The rest  was negative with appropriate responses to positive and negative controls.  See scanned testing sheet for more details.    ASSESSMENT/PLAN:    Problem List Items Addressed This Visit        Respiratory    1. Chronic seasonal allergic rhinitis due to pollen  Avoidance measures discussed and information provided.  He may continue taking loratadine on as needed basis since it has been helpful. If symptoms persist despite medications and allergen avoidance, or if medications are not tolerated, allergen immunotherapy is recommended. Discussed briefly about allergen immunotherapy today.  There is some data suggesting that pollen food syndrome symptoms may improve with allergen immunotherapy; however, I would not make that as a primary indication for allergen immunotherapy.      Relevant Medications        Loratadine (CLARITIN PO)        Other Relevant Orders    ALLERGY SKIN TESTS,ALLERGENS (Completed)    2. Other allergic rhinitis    Relevant Medications        Loratadine (CLARITIN PO)        Other Relevant Orders    ALLERGY SKIN TESTS,ALLERGENS (Completed)    3. Chronic allergic rhinitis due to animal hair and dander    Relevant Medications        Loratadine (CLARITIN PO)        Other Relevant Orders    ALLERGY SKIN TESTS,ALLERGENS (Completed)    4. Mild persistent asthma without complication  The patient was not aware that he was wheezing today until I told him so.  Poor perceiver?  -Discussed smoking cessation.  -Start Arnuity 100 mcg 1 puff once daily.  -Use albuterol inhaler 2-4 puffs every 4 hours as needed for persistent cough/chest tightness/wheezing/shortness of breath.  Use albuterol inhaler with the chamber device.    Relevant Medications            fluticasone furoate (ARNUITY ELLIPTA) 100 MCG/ACT AEPB inhalation powder    Other Relevant Orders    OPTICHAMBER (Completed)    ALBUTEROL UNIT DOSE, 1 MG (Completed)    BRONCHODILATION RESPONSE, PRE/POST ADMIN (Completed)       Other    5. Pollen-food allergy         6. Tobacco abuse     I believe that cigarette smoking contributes to his chest symptoms.  -Tobacco cessation was strongly encouraged.    7. Reaction to food, initial encounter    -  Primary  On the skin test, the patient had a significant response to grass pollen, Birch, and ragweed.  Considering the patient's history of reactions to fresh cabbage, celery, apples, watermelon, cantaloupe, and lettuce I can conclude that he has food pollen syndrome.  The patient has oral allergy syndrome (Food-Pollen syndrome), and this condition was explained to him in detail.  Sensitization to aeroallergens like Bet v 1 (birch) with a respiratory route may result in cross-reactivity to type 2 allergens like apples, peaches, and other foods when eating fresh. It may also occur with sensitization to weed and grass pollen.  This is IgE mediated reaction, rarely progresses to more severe symptoms, and certain forms of food vegetables may be tolerated(cooked).  Most of the reactions are mild and limited to the throat or face area. Sometimes may cause some GI effects and rarely several allergic reactions (not completely impossible). Symptoms may vary by season.  Normally it is not advised the patient to have an epinephrine device; however, his story tells me that the reactions are more severe, and he would benefit from having epinephrine autoinjector immediately available.  I prescribed him EpiPen.  Anaphylaxis action plan was reviewed and provided.  I also provided the patient with a list of foods that cross-react with different pollens.  I favor the idea that 2 of his reactions that brought him to ED were triggered by foods that also make part of the pollen-food syndrome.    Relevant Medications  EPINEPHrine (EPIPEN/ADRENACLICK/OR ANY BX GENERIC EQUIV) 0.3 MG/0.3ML injection 2-pack                  Return in about 2 months (around 10/11/2018), or if symptoms worsen or fail to improve, for rhinitis, asthma follow up, food allergy follow  up.    Thank you for allowing us to participate in the care of this patient. Please feel free to contact us if there are any questions or concerns about the patient.    Disclaimer: This note consists of symbols derived from keyboarding, dictation and/or voice recognition software. As a result, there may be errors in the script that have gone undetected. Please consider this when interpreting information found in this chart.    Roman Cardoza MD, [facility]  Allergy, Asthma and Immunology  [place], [place] and [place]      Again, thank you for allowing me to participate in the care of your patient.        Sincerely,        Roman Cardoza MD

## 2018-08-10 NOTE — PROGRESS NOTES
"SUBJECTIVE:                                                                   Jose Daniel Olvera presents today to our Allergy Clinic at [facility] for a new patient visit.   He is a 28-year-old male with several allergic reactions and ED visits for that reason.  He started having these episodes several years ago.  He had 2 ED visits for this reason, in January 2017 and June 2018.    Before his first episode to the ED, the patient had an episode of throat tightness and wheezing after eating and a fresh apple.  He had several reproducible episodes with similar symptoms.    In January 2017, the patient noticed that he had some slight swelling of the right lateral lower eyelid being at home.  He does not think that he ate anything unusual.  In any case, she believes that all the foods that he ate on that day he was able to reintroduce in his diet without any problems.  Subsequently, that slightly swollen area progressed.  He also developed tightness in his throat and wheezing.  He reports having wheezing on other occasions as well, not necessarily related to allergic reactions.  In the ED, he was hypertensive, in mild to moderate distress.  It was noted that he had soft tissue swelling under and lateral to the right eye and cheek area.  He was also wheezing on the exam.  He was treated with DuoNeb, famotidine, diphenhydramine, normal saline, and methylprednisolone. He doesn't think he had an apple on that day.     Between 2017 and 2018 episodes, he had episodes of urticaria, lip swelling, eyelid swelling, wheezing, dermatographism, rhinorrhea, triggered by ingestion of cabbage, celery, apples, watermelon, cantaloupe, and lettuce.  The symptoms were not as \"violent,\" so he did not present to the ED.  He was treating himself with diphenhydramine successfully.    The last episode that required ED visit happened in June 2018.  He was at a [facility], and he had a beer that he did not have before.  " Possibly, it had honey in it.  He went outside shortly after drinking it and felt slight pruritus outside of his right eye.  After scratching, he developed right-sided periorbital swelling.  Shortly after, he developed upper lip angioedema and skin rash of the torso and upper extremities.  In the ED, he was hypertensive again, 146/97.  Urticarial rash was noted on upper extremities and torso.  He also had mild angioedema of the lips and right-sided periorbital edema.  The patient was treated with epinephrine, Solu-Medrol, and Pepcid.  Jose Daniel is worried about a new reaction and asks for epinephrine prescription.    About 5 years ago, after having a viral infection, the patient began to have acute chest symptoms manifested by wheezing, dyspnea and chest tightness, triggered by viral respiratory infections, humidity, exertion and pollen in Spring.  He denies having any issues with pets exposure.  Albuterol use acutely improves chest symptoms.  He is using albuterol as needed.  He also uses albuterol pre-exertionally and is working fine with him.    Jose Daniel is using albuterol once a month for rescue from chest symptoms.  He is waking up less than twice per month due to chest symptoms.  There has been no use of oral steroids for the last year.  No ED/PCP/urgent care/other specialist visits for asthma flare for the last year.  The patient denies current chest tightness, cough, wheezing or shortness of breath.  He denies the history of hospitalizations for asthma in the past.  He has never been to the ICU for chest symptoms, and he has never been intubated.  He had a walking pneumonia in the past.  He does not use a spacer/chamber device with the albuterol inhaler.  He smokes cigars on occasions.  He smoked one several days ago.    He has rhinorrhea mainly in Spring. He thinks it's a mild one. Loratadine seems to be helpful.  No history of ear tubes, sinus surgeries, or tonsillectomy/adenoidectomy.    Patient Active Problem  List   Diagnosis     Chronic seasonal allergic rhinitis due to pollen     Other allergic rhinitis     Chronic allergic rhinitis due to animal hair and dander     Mild persistent asthma without complication     Pollen-food allergy       History reviewed. No pertinent past medical history.   Problem (# of Occurrences) Relation (Name,Age of Onset)    Allergy (Severe) (2) Father: Food allergies, Sister: Food allergies    Pancreatic Cancer (1) Maternal Grandfather        Past Surgical History:   Procedure Laterality Date     HC TOOTH EXTRACTION W/FORCEP  2010     Social History     Social History     Marital status: Single     Spouse name: N/A     Number of children: N/A     Years of education: N/A     Occupational History           Social History Main Topics     Smoking status: Light Tobacco Smoker     Types: Cigars, Dip, chew, snus or snuff     Smokeless tobacco: Never Used     Alcohol use Yes      Comment: occ.      Drug use: No     Sexual activity: Not Asked     Other Topics Concern     None     Social History Narrative    August 10, 2018    ENVIRONMENTAL HISTORY: The family [place] a [place] year [place]. The home is heated with a forced air. They do have central air conditioning. The patient's bedroom is furnished with carpeting in bedroom and fabric window coverings.  Pets inside the house include 1 dog. There is no history of cockroach or mice infestation. There is 1 smoker in the house.  The house does not have a damp basement.                Review of Systems   Constitutional: Negative for activity change, fatigue and fever.   HENT: Negative for congestion, dental problem, ear pain, facial swelling, nosebleeds, postnasal drip, rhinorrhea, sinus pressure and sneezing.    Eyes: Negative for discharge, redness and itching.   Respiratory: Positive for wheezing. Negative for cough, chest tightness and shortness of breath.    Cardiovascular: Negative for chest pain.   Gastrointestinal:  "Negative for diarrhea, nausea and vomiting.   Musculoskeletal: Negative for arthralgias, joint swelling and myalgias.   Skin: Negative for rash.   Neurological: Negative for headaches.   Hematological: Positive for adenopathy.   Psychiatric/Behavioral: Negative for behavioral problems and self-injury.           Current Outpatient Prescriptions:      EPINEPHrine (EPIPEN/ADRENACLICK/OR ANY BX GENERIC EQUIV) 0.3 MG/0.3ML injection 2-pack, Inject 0.3 mLs (0.3 mg) into the muscle as needed for anaphylaxis, Disp: 0.6 mL, Rfl: 3     fluticasone furoate (ARNUITY ELLIPTA) 100 MCG/ACT AEPB inhalation powder, Inhale 1 puff into the lungs daily, Disp: 1 each, Rfl: 3     albuterol (ALBUTEROL) 108 (90 BASE) MCG/ACT Inhaler, Inhale 2 puffs into the lungs every 4 hours as needed for shortness of breath / dyspnea (Patient not taking: Reported on 8/10/2018), Disp: 1 Inhaler, Rfl: 0     DiphenhydrAMINE HCl (BENADRYL PO), , Disp: , Rfl:      EPINEPHrine 0.3 MG/0.3ML injection, Inject 0.3 mLs (0.3 mg) into the muscle once as needed (Patient not taking: Reported on 8/10/2018), Disp: 0.6 mL, Rfl: 0     Loratadine (CLARITIN PO), , Disp: , Rfl:     There is no immunization history on file for this patient.  Allergies   Allergen Reactions     Augmentin Hives and Itching     OBJECTIVE:                                                                 /77 (BP Location: Left arm, Patient Position: Sitting, Cuff Size: Adult Regular)  Pulse 65  Temp 97.4  F (36.3  C) (Oral)  Resp 16  Ht 1.77 m (5' 9.69\")  Wt 77.1 kg (169 lb 15.6 oz)  SpO2 100%  BMI 24.61 kg/m2        Physical Exam   Constitutional: He is oriented to person, place, and time. No distress.   HENT:   Head: Normocephalic and atraumatic.   Right Ear: Tympanic membrane, external ear and ear canal normal.   Left Ear: Tympanic membrane, external ear and ear canal normal.   Nose: No mucosal edema or rhinorrhea.   Mouth/Throat: Mucous membranes are normal. No oropharyngeal " exudate, posterior oropharyngeal edema or posterior oropharyngeal erythema.   Cobblestoning of posterior oropharynx noted   Eyes: Conjunctivae are normal. Right eye exhibits no discharge. Left eye exhibits no discharge.   Neck: Normal range of motion.   Cardiovascular: Normal rate, regular rhythm and normal heart sounds.    No murmur heard.  Pulmonary/Chest: Effort normal. No respiratory distress. He has no decreased breath sounds. He has wheezes (occasional, end expiratory, R>L). He has no rhonchi. He has no rales.   Musculoskeletal: Normal range of motion.   Lymphadenopathy:     He has no cervical adenopathy.   Neurological: He is alert and oriented to person, place, and time.   Skin: Skin is warm. He is not diaphoretic.   Psychiatric: Affect normal.   Nursing note and vitals reviewed.      WORKUP:     SPIROMETRY  initial FVC 5.33L (97% of predicted); after bronchodilator 5.32L (+0% change)   initial FEV1 4.02L (88% of predicted); after bronchodilator 4.37L (+8% change)  initial FEV1/FVC 75%; after bronchodilator 82%  initial FEF 25%-75% 3.43L/s (72% of predicted); after bronchodilator 4.21L/s (+22% change)    Office spirometry performed today suggests borderline small airway limitation. No reversibility after nebulized albuterol noted.  However, wheezing resolved after the nebulized treatment.    Asthma Control Test (ACT) total score: 22      Percutaneous skin puncture testing for aeroallergens performed today (August 10, 2018) showed sensitivity to dog, grass pollen (Tomi grass and grass mix #7), tree pollen (red Cedar, Maple/[place], American Elm, Birch mix, [place], oak, cocklebur, and [place]), and weed pollen (Kochia, ragweed, Sagebrush/mugwort, and Sorrel) and feather mix.  The rest was negative with appropriate responses to positive and negative controls.  See scanned testing sheet for more details.    ASSESSMENT/PLAN:    Problem List Items Addressed This Visit        Respiratory    1. Chronic  seasonal allergic rhinitis due to pollen  Avoidance measures discussed and information provided.  He may continue taking loratadine on as needed basis since it has been helpful. If symptoms persist despite medications and allergen avoidance, or if medications are not tolerated, allergen immunotherapy is recommended. Discussed briefly about allergen immunotherapy today.  There is some data suggesting that pollen food syndrome symptoms may improve with allergen immunotherapy; however, I would not make that as a primary indication for allergen immunotherapy.      Relevant Medications        Loratadine (CLARITIN PO)        Other Relevant Orders    ALLERGY SKIN TESTS,ALLERGENS (Completed)    2. Other allergic rhinitis    Relevant Medications        Loratadine (CLARITIN PO)        Other Relevant Orders    ALLERGY SKIN TESTS,ALLERGENS (Completed)    3. Chronic allergic rhinitis due to animal hair and dander    Relevant Medications        Loratadine (CLARITIN PO)        Other Relevant Orders    ALLERGY SKIN TESTS,ALLERGENS (Completed)    4. Mild persistent asthma without complication  The patient was not aware that he was wheezing today until I told him so.  Poor perceiver?  -Discussed smoking cessation.  -Start Arnuity 100 mcg 1 puff once daily.  -Use albuterol inhaler 2-4 puffs every 4 hours as needed for persistent cough/chest tightness/wheezing/shortness of breath.  Use albuterol inhaler with the chamber device.    Relevant Medications            fluticasone furoate (ARNUITY ELLIPTA) 100 MCG/ACT AEPB inhalation powder    Other Relevant Orders    OPTICHAMBER (Completed)    ALBUTEROL UNIT DOSE, 1 MG (Completed)    BRONCHODILATION RESPONSE, PRE/POST ADMIN (Completed)       Other    5. Pollen-food allergy        6. Tobacco abuse     I believe that cigarette smoking contributes to his chest symptoms.  -Tobacco cessation was strongly encouraged.    7. Reaction to food, initial encounter    -  Primary  On the skin test, the  patient had a significant response to grass pollen, Birch, and ragweed.  Considering the patient's history of reactions to fresh cabbage, celery, apples, watermelon, cantaloupe, and lettuce I can conclude that he has food pollen syndrome.  The patient has oral allergy syndrome (Food-Pollen syndrome), and this condition was explained to him in detail.  Sensitization to aeroallergens like Bet v 1 (birch) with a respiratory route may result in cross-reactivity to type 2 allergens like apples, peaches, and other foods when eating fresh. It may also occur with sensitization to weed and grass pollen.  This is IgE mediated reaction, rarely progresses to more severe symptoms, and certain forms of food vegetables may be tolerated(cooked).  Most of the reactions are mild and limited to the throat or face area. Sometimes may cause some GI effects and rarely several allergic reactions (not completely impossible). Symptoms may vary by season.  Normally it is not advised the patient to have an epinephrine device; however, his story tells me that the reactions are more severe, and he would benefit from having epinephrine autoinjector immediately available.  I prescribed him EpiPen.  Anaphylaxis action plan was reviewed and provided.  I also provided the patient with a list of foods that cross-react with different pollens.  I favor the idea that 2 of his reactions that brought him to ED were triggered by foods that also make part of the pollen-food syndrome.    Relevant Medications  EPINEPHrine (EPIPEN/ADRENACLICK/OR ANY BX GENERIC EQUIV) 0.3 MG/0.3ML injection 2-pack                  Return in about 2 months (around 10/11/2018), or if symptoms worsen or fail to improve, for rhinitis, asthma follow up, food allergy follow up.    Thank you for allowing us to participate in the care of this patient. Please feel free to contact us if there are any questions or concerns about the patient.    Disclaimer: This note consists of symbols  derived from keyboarding, dictation and/or voice recognition software. As a result, there may be errors in the script that have gone undetected. Please consider this when interpreting information found in this chart.    Roman Cardoza MD, [facility]  Allergy, Asthma and Immunology  [place], [place] and [place]

## 2018-08-10 NOTE — MR AVS SNAPSHOT
After Visit Summary   8/10/2018    Jose Daniel Olvera    MRN: [id number]           Patient Information     Date Of Birth          1990        Visit Information        Provider Department      8/10/2018 7:00 AM Roman Cardoza MD [facility]        Today's Diagnoses     Mild persistent asthma without complication    -  1    Tobacco abuse          Care Instructions    AEROALLERGEN AVOIDANCE INSTRUCTIONS    POLLEN  Pollens are the tiny airborne particles given off by trees, weeds, and grasses. They can be the cause of seasonal allergic rhinitis or hay fever symptoms, which include stuffy, itchy, runny nose, redness, swelling and itching of the eyes, and itching of the ears and throat. Here are some tips on how to avoid pollen exposure.  1. .Keep windows closed and use the air conditioner when possible.  2.  Avoid outside exposure in the early morning as pollen counts are highest at that time.  3.  Take a shower and wash hair each night.  4.  Consider wearing a mask when working in the yard and/or garden.  5.  Clean furnace filter monthly with HEPA filters. Consider a HEPA filter vacuum  which will prevent pollen from being reintroduced into the air.     PETS  Pets present many problems for people with allergies. Dander from pets is very difficult to remove and also is a food source for dust mites.  1.  If possible, find the pet a new home.  2.  If not possible, keep the pet outdoors. Never allow the pet into the bedroom.  3.  Wash pet weekly in warm water.  4.  Encase mattresses, pillows, and box springs in allergen-proof covers.  5.  Use HEPA air filters and a HEPA filter vacuum . Change filters monthly.              Follow-ups after your visit        Follow-up notes from your care team     Return in about 2 months (around 10/11/2018), or if symptoms worsen or fail to improve, for rhinitis follow up, asthma follow up, food allergy follow up.      Your next 10 appointments  "already scheduled     Oct 11, 2018  7:00 AM CDT   Return Visit with Roman Cardoza MD   [facility]    [address] [address] [phone]              Who to contact     If you have questions or need follow up information about today's clinic visit or your schedule please contact [facility] directly at [phone].  Normal or non-critical lab and imaging results will be communicated to you by MyChart, letter or phone within 4 business days after the clinic has received the results. If you do not hear from us within 7 days, please contact the clinic through MyChart or phone. If you have a critical or abnormal lab result, we will notify you by phone as soon as possible.  Submit refill requests through [employer] or call your pharmacy and they will forward the refill request to us. Please allow 3 business days for your refill to be completed.          Additional Information About Your Visit        Care EveryWhere ID     This is your Care EveryWhere ID. This could be used by other organizations to access your [facility] medical records  [id number]        Your Vitals Were     Pulse Temperature Respirations Height Pulse Oximetry BMI (Body Mass Index)    65 97.4  F (36.3  C) (Oral) 16 1.77 m (5' 9.69\") 100% 24.61 kg/m2       Blood Pressure from Last 3 Encounters:   08/10/18 133/77   06/21/18 146/84   01/31/17 118/72    Weight from Last 3 Encounters:   08/10/18 77.1 kg (169 lb 15.6 oz)   10/28/14 63.5 kg (140 lb)   04/28/14 65.8 kg (145 lb)              We Performed the Following     ALLERGY SKIN TESTS,ALLERGENS     OPTICHAMBER          Today's Medication Changes          These changes are accurate as of 8/10/18 10:07 AM.  If you have any questions, ask your nurse or doctor.               Start taking these medicines.        Dose/Directions    fluticasone furoate 100 MCG/ACT Aepb inhalation powder   Commonly known as:  ARNUITY ELLIPTA   Used " for:  Mild persistent asthma without complication   Started by:  Roman Cardoza MD        Dose:  1 puff   Inhale 1 puff into the lungs daily   Quantity:  1 each   Refills:  3         These medicines have changed or have updated prescriptions.        Dose/Directions    * EPINEPHrine 0.3 MG/0.3ML injection 2-pack   Commonly known as:  EPIPEN/ADRENACLICK/or ANY BX GENERIC EQUIV   This may have changed:  Another medication with the same name was added. Make sure you understand how and when to take each.   Changed by:  Roman Cardoza MD        Dose:  0.3 mg   Inject 0.3 mLs (0.3 mg) into the muscle once as needed   Quantity:  0.6 mL   Refills:  0       * EPINEPHrine 0.3 MG/0.3ML injection 2-pack   Commonly known as:  EPIPEN/ADRENACLICK/or ANY BX GENERIC EQUIV   This may have changed:  You were already taking a medication with the same name, and this prescription was added. Make sure you understand how and when to take each.   Used for:  Mild persistent asthma without complication   Changed by:  Roman Cardoza MD        Dose:  0.3 mg   Inject 0.3 mLs (0.3 mg) into the muscle as needed for anaphylaxis   Quantity:  0.6 mL   Refills:  3       * Notice:  This list has 2 medication(s) that are the same as other medications prescribed for you. Read the directions carefully, and ask your doctor or other care provider to review them with you.         Where to get your medicines      These medications were sent to [facility] [address] [place] [address] [facility]  [address] [address]     Phone:  [phone]     EPINEPHrine 0.3 MG/0.3ML injection 2-pack    fluticasone furoate 100 MCG/ACT Aepb inhalation powder                Primary Care Provider Fax #    Physician No Ref-Primary [phone]       No address on file        Equal Access to Services     REAL LOZA : Sabrina Higgins, julio césar lujan, vega emanuel, misael taylor. [facility]  [phone].    ATENCIÓN: Si flora segal, tiene a kitchen disposición servicios gratuitos de asistencia lingüística. Alicia villa [phone].    We comply with applicable federal civil rights laws and [place] laws. We do not discriminate on the basis of race, color, national origin, age, disability, sex, sexual orientation, or gender identity.            Thank you!     Thank you for choosing [facility]  for your care. Our goal is always to provide you with excellent care. Hearing back from our patients is one way we can continue to improve our services. Please take a few minutes to complete the written survey that you may receive in the mail after your visit with us. Thank you!             Your Updated Medication List - Protect others around you: Learn how to safely use, store and throw away your medicines at www.disposemymeds.org.          This list is accurate as of 8/10/18 10:07 AM.  Always use your most recent med list.                   Brand Name Dispense Instructions for use Diagnosis    albuterol 108 (90 Base) MCG/ACT Inhaler    PROAIR HFA    1 Inhaler    Inhale 2 puffs into the lungs every 4 hours as needed for shortness of breath / dyspnea        BENADRYL PO           CLARITIN PO           * EPINEPHrine 0.3 MG/0.3ML injection 2-pack    EPIPEN/ADRENACLICK/or ANY BX GENERIC EQUIV    0.6 mL    Inject 0.3 mLs (0.3 mg) into the muscle once as needed        * EPINEPHrine 0.3 MG/0.3ML injection 2-pack    EPIPEN/ADRENACLICK/or ANY BX GENERIC EQUIV    0.6 mL    Inject 0.3 mLs (0.3 mg) into the muscle as needed for anaphylaxis    Mild persistent asthma without complication       fluticasone furoate 100 MCG/ACT Aepb inhalation powder    ARNUITY ELLIPTA    1 each    Inhale 1 puff into the lungs daily    Mild persistent asthma without complication       * Notice:  This list has 2 medication(s) that are the same as other medications prescribed for you. Read the directions carefully, and ask your doctor or  other care provider to review them with you.

## 2018-08-10 NOTE — NURSING NOTE
Writer demonstrated how to use an MDI inhaler with a spacer for patient.  Patient instructed to shake the inhaler for 5 seconds, empty the lungs by exhaling away from inhaler, put the inhaler + spacer in his mouth, push down on the inhaler and breathe in at the same time and then hold breath for 10 seconds.  RN informed patient that if an audible whistle is heard from spacer, he needs to inhale more slowly.  Patient advised to wait 1-2 minutes between puffs.  Patient instructed on how to clean the MDI inhaler, take the medication canister out, wash it in warm soapy water, rinse it and then let it dry over night.  RN advised patient to refer to handout with spacer for cleaning instructions.  Patient informed the inhaler needs to be washed once a week or when he notices a powder buildup.  Patient verbalized understanding.     Write demonstrated epi pen technique.  Informed that he must pull blue end off of pen before use.  Hold firmly in one hand and press down into the thigh. The injection should go in the middle, outer thigh and hold for 10 seconds to ensure the delivery of all medication.  Informed that the needle can go through clothing but that any seams should be avoided.  Instructed to keep both pens together in case a second dose is needed.  Instructed that if epi is used, he should still go to the ED.  Instructed to keep epi-pen out of extreme temperatures.  Check expiration date.  Do not use  Epi.  Patient verbalized understanding.    RN reviewed Anaphylaxis Action Plan with patient. Educated on the symptoms and treatment of anaphylaxis. Went through the different ways that a reaction can present, and the body systems that it can affect. Patient verbalized understanding.     Jo Sherman RN

## 2018-08-10 NOTE — PATIENT INSTRUCTIONS
AEROALLERGEN AVOIDANCE INSTRUCTIONS    POLLEN  Pollens are the tiny airborne particles given off by trees, weeds, and grasses. They can be the cause of seasonal allergic rhinitis or hay fever symptoms, which include stuffy, itchy, runny nose, redness, swelling and itching of the eyes, and itching of the ears and throat. Here are some tips on how to avoid pollen exposure.  1. .Keep windows closed and use the air conditioner when possible.  2.  Avoid outside exposure in the early morning as pollen counts are highest at that time.  3.  Take a shower and wash hair each night.  4.  Consider wearing a mask when working in the yard and/or garden.  5.  Clean furnace filter monthly with HEPA filters. Consider a HEPA filter vacuum  which will prevent pollen from being reintroduced into the air.     PETS  Pets present many problems for people with allergies. Dander from pets is very difficult to remove and also is a food source for dust mites.  1.  If possible, find the pet a new home.  2.  If not possible, keep the pet outdoors. Never allow the pet into the bedroom.  3.  Wash pet weekly in warm water.  4.  Encase mattresses, pillows, and box springs in allergen-proof covers.  5.  Use HEPA air filters and a HEPA filter vacuum . Change filters monthly.

## 2018-08-10 NOTE — NURSING NOTE
Per provider verbal order, RN placed Adult Environmental Panel scratch testing panels.  Consent was obtained prior to procedure.  Once panels were placed, patient was monitored for 15 minutes in clinic.  RN read test after 15 minutes and provider was notified of results.  Pt tolerated procedure well.  All questions and concerns were addressed at office visit.     Jo MICHAEL   Allergy RN

## 2018-08-10 NOTE — LETTER
My Asthma Action Plan  Name: Jose Daniel Olvera   YOB: 1990  Date: 8/10/2018   My doctor: Roman Cardoza MD   My clinic: [facility]        My Control Medicine: Fluticasone furoate (Arnuity Ellipta) -  100 mcg 1 puff once daily  My Rescue Medicine: Albuterol (Proair/Ventolin/Proventil) inhaler 2-4 puffs every 4 hrs as needed   My Asthma Severity: mild persistent  Avoid your asthma triggers: upper respiratory infections, pollens, humidity and exercise or sports               GREEN ZONE   Good Control    I feel good    No cough or wheeze    Can work, sleep and play without asthma symptoms       Take your asthma control medicine every day.     1. If exercise triggers your asthma, take your rescue medication    15 minutes before exercise or sports, and    During exercise if you have asthma symptoms  2. Spacer to use with inhaler: If you have a spacer, make sure to use it with your inhaler             YELLOW ZONE Getting Worse  I have ANY of these:    I do not feel good    Cough or wheeze    Chest feels tight    Wake up at night   1. Keep taking your Green Zone medications  2. Start taking your rescue medicine:    every 20 minutes for up to 1 hour. Then every 4 hours for 24-48 hours.  3. If you stay in the Yellow Zone for more than 24-48  hours, contact your doctor.             RED ZONE Medical Alert - Get Help  I have ANY of these:    I feel awful    Medicine is not helping    Breathing getting harder    Trouble walking or talking    Nose opens wide to breathe       1. Take your rescue medicine NOW  2. If your provider has prescribed an oral steroid medicine, start taking it NOW  3. Call your doctor NOW  4. If you are still in the Red Zone after 20 minutes and you have not reached your doctor:    Take your rescue medicine again and    Call 911 or go to the emergency room right away    See your regular doctor within 2 weeks of an Emergency Room or Urgent Care visit for follow-up treatment.           Annual Reminders:  Meet with Asthma Educator,  Flu Shot in the Fall, consider Pneumonia Vaccination for patients with asthma (aged 19 and older).    Pharmacy:    [id number] [facility] [employer] - [place], [place] - [address] [facility] [facility] [id number] [place], [place] - [address]                      Asthma Triggers  How To Control Things That Make Your Asthma Worse    Triggers are things that make your asthma worse.  Look at the list below to help you find your triggers and what you can do about them.  You can help prevent asthma flare-ups by staying away from your triggers.      Trigger                                                          What you can do   Cigarette Smoke  Tobacco smoke can make asthma worse. Do not allow smoking in your home, car or around you.  Be sure no one smokes at a child s day care or school.  If you smoke, ask your health care provider for ways to help you quit.  Ask family members to quit too.  Ask your health care provider for a referral to Quit Plan to help you quit smoking, or call 2-264-422-PLAN.     Colds, Flu, Bronchitis  These are common triggers of asthma. Wash your hands often.  Don t touch your eyes, nose or mouth.  Get a flu shot every year.     Dust Mites  These are tiny bugs that live in cloth or carpet. They are too small to see. Wash sheets and blankets in hot water every week.   Encase pillows and mattress in dust mite proof covers.  Avoid having carpet if you can. If you have carpet, vacuum weekly.   Use a dust mask and HEPA vacuum.   Pollen and Outdoor Mold  Some people are allergic to trees, grass, or weed pollen, or molds. Try to keep your windows closed.  Limit time out doors when pollen count is high.   Ask you health care provider about taking medicine during allergy season.     Animal Dander  Some people are allergic to skin flakes, urine or saliva from pets with fur or feathers. Keep pets with fur or feathers out of your  home.    If you can t keep the pet outdoors, then keep the pet out of your bedroom.  Keep the bedroom door closed.  Keep pets off cloth furniture and away from stuffed toys.     Mice, Rats, and Cockroaches  Some people are allergic to the waste from these pests.   Cover food and garbage.  Clean up spills and food crumbs.  Store grease in the refrigerator.   Keep food out of the bedroom.   Indoor Mold  This can be a trigger if your home has high moisture. Fix leaking faucets, pipes, or other sources of water.   Clean moldy surfaces.  Dehumidify basement if it is damp and smelly.   Smoke, Strong Odors, and Sprays  These can reduce air quality. Stay away from strong odors and sprays, such as perfume, powder, hair spray, paints, smoke incense, paint, cleaning products, candles and new carpet.   Exercise or Sports  Some people with asthma have this trigger. Be active!  Ask your doctor about taking medicine before sports or exercise to prevent symptoms.    Warm up for 5-10 minutes before and after sports or exercise.     Other Triggers of Asthma  Cold air:  Cover your nose and mouth with a scarf.  Sometimes laughing or crying can be a trigger.  Some medicines and food can trigger asthma.

## 2018-08-11 ASSESSMENT — ASTHMA QUESTIONNAIRES: ACT_TOTALSCORE: 22

## 2018-08-12 PROBLEM — J45.30 MILD PERSISTENT ASTHMA WITHOUT COMPLICATION: Status: ACTIVE | Noted: 2018-08-12

## 2018-08-12 PROBLEM — T78.1XXA POLLEN-FOOD ALLERGY: Status: ACTIVE | Noted: 2018-08-12

## 2018-08-12 PROBLEM — J30.89 OTHER ALLERGIC RHINITIS: Status: ACTIVE | Noted: 2018-08-12

## 2018-08-12 PROBLEM — J30.1 CHRONIC SEASONAL ALLERGIC RHINITIS DUE TO POLLEN: Status: ACTIVE | Noted: 2018-08-12

## 2018-08-12 PROBLEM — J30.81 CHRONIC ALLERGIC RHINITIS DUE TO ANIMAL HAIR AND DANDER: Status: ACTIVE | Noted: 2018-08-12
